# Patient Record
Sex: FEMALE | Race: BLACK OR AFRICAN AMERICAN | NOT HISPANIC OR LATINO | URBAN - METROPOLITAN AREA
[De-identification: names, ages, dates, MRNs, and addresses within clinical notes are randomized per-mention and may not be internally consistent; named-entity substitution may affect disease eponyms.]

---

## 2024-02-14 ENCOUNTER — OV NP (OUTPATIENT)
Dept: URBAN - METROPOLITAN AREA CLINIC 84 | Facility: CLINIC | Age: 59
End: 2024-02-14

## 2024-02-20 ENCOUNTER — OV NP (OUTPATIENT)
Dept: URBAN - METROPOLITAN AREA CLINIC 84 | Facility: CLINIC | Age: 59
End: 2024-02-20
Payer: OTHER GOVERNMENT

## 2024-02-20 VITALS
TEMPERATURE: 97.2 F | HEIGHT: 64 IN | BODY MASS INDEX: 37.11 KG/M2 | SYSTOLIC BLOOD PRESSURE: 156 MMHG | HEART RATE: 71 BPM | DIASTOLIC BLOOD PRESSURE: 78 MMHG | WEIGHT: 217.4 LBS

## 2024-02-20 DIAGNOSIS — Z86.010 HX OF COLONIC POLYPS: ICD-10-CM

## 2024-02-20 PROBLEM — 428283002: Status: ACTIVE | Noted: 2024-02-20

## 2024-02-20 PROCEDURE — 99202 OFFICE O/P NEW SF 15 MIN: CPT

## 2024-02-20 RX ORDER — AMOXICILLIN 500 MG/1
CAPSULE ORAL
Qty: 32 CAPSULE | Refills: 0 | Status: ACTIVE | COMMUNITY

## 2024-02-20 NOTE — HPI-TODAY'S VISIT:
Ms. Ruffin is a 58y F, she presents to the clinic for colon cancer screening [facility]  No change in bowel habits, daily BM's, no hard stools/straining  No change in weight No change in appetite No n/v No BRBPR, no Melena No abdominal pain No SOB, no CP Last colonoscopy: 9 years ago at age 49, 2 polyps removed, not on file  No known family h/o colon cancer/polyps. Denies cardiac hardware, dialysis, blood thinner use, and or issues with anesthesia

## 2024-02-28 ENCOUNTER — COLON (OUTPATIENT)
Dept: URBAN - METROPOLITAN AREA SURGERY CENTER 20 | Facility: SURGERY CENTER | Age: 59
End: 2024-02-28